# Patient Record
Sex: FEMALE | Race: BLACK OR AFRICAN AMERICAN | Employment: PART TIME | URBAN - METROPOLITAN AREA
[De-identification: names, ages, dates, MRNs, and addresses within clinical notes are randomized per-mention and may not be internally consistent; named-entity substitution may affect disease eponyms.]

---

## 2023-08-11 LAB
CREATININE, EXTERNAL: 0.88
HBA1C MFR BLD HPLC: 6.6 %
LDL CHOLESTEROL, EXTERNAL: 56

## 2023-08-30 ENCOUNTER — OFFICE VISIT (OUTPATIENT)
Age: 43
End: 2023-08-30
Payer: MEDICAID

## 2023-08-30 VITALS
DIASTOLIC BLOOD PRESSURE: 82 MMHG | HEIGHT: 66 IN | WEIGHT: 273.6 LBS | SYSTOLIC BLOOD PRESSURE: 124 MMHG | BODY MASS INDEX: 43.97 KG/M2 | HEART RATE: 72 BPM

## 2023-08-30 DIAGNOSIS — E11.65 TYPE 2 DIABETES MELLITUS WITH HYPERGLYCEMIA, WITHOUT LONG-TERM CURRENT USE OF INSULIN (HCC): Primary | ICD-10-CM

## 2023-08-30 DIAGNOSIS — E66.01 CLASS 3 OBESITY (HCC): ICD-10-CM

## 2023-08-30 PROCEDURE — 99205 OFFICE O/P NEW HI 60 MIN: CPT | Performed by: INTERNAL MEDICINE

## 2023-08-30 PROCEDURE — 95251 CONT GLUC MNTR ANALYSIS I&R: CPT | Performed by: INTERNAL MEDICINE

## 2023-08-30 RX ORDER — PANTOPRAZOLE SODIUM 40 MG/1
TABLET, DELAYED RELEASE ORAL DAILY
COMMUNITY

## 2023-08-30 RX ORDER — PREGABALIN 75 MG/1
75 CAPSULE ORAL 2 TIMES DAILY
COMMUNITY

## 2023-08-30 RX ORDER — RIZATRIPTAN BENZOATE 10 MG/1
TABLET, ORALLY DISINTEGRATING ORAL
COMMUNITY
Start: 2023-04-14

## 2023-08-30 RX ORDER — MELOXICAM 7.5 MG/1
7.5 TABLET ORAL AS NEEDED
COMMUNITY
Start: 2023-04-13

## 2023-08-30 RX ORDER — TRANEXAMIC ACID 650 MG/1
1300 TABLET ORAL AS NEEDED
COMMUNITY

## 2023-08-30 RX ORDER — COVID-19 ANTIGEN TEST
KIT MISCELLANEOUS AS NEEDED
COMMUNITY

## 2023-08-30 RX ORDER — OMEGA-3 FATTY ACIDS/FISH OIL 300-1000MG
CAPSULE ORAL AS NEEDED
COMMUNITY

## 2023-08-30 RX ORDER — METHOCARBAMOL 750 MG/1
750 TABLET, FILM COATED ORAL AS NEEDED
COMMUNITY
Start: 2022-11-16

## 2023-08-30 RX ORDER — METFORMIN HYDROCHLORIDE 500 MG/1
500 TABLET, EXTENDED RELEASE ORAL DAILY
COMMUNITY

## 2023-08-30 NOTE — PROGRESS NOTES
Chief Complaint   Patient presents with    New Patient     PCP and pharmacy confirmed     Diabetes     History of Present Illness: Enrrique Castro is a 41 y.o. female who is a new patient for evaluation of diabetes. Transferring care from Dr. Trupti Matute due to change in insurance and last visit was in March 2023. Was diagnosed with diabetes sometime in the past 5 years or more. Current regimen is metformin  mg 1 tab daily in the afternoon but if her sugars are over about 185, will often take a 2nd tab. Has not been on any other meds for DM aside from 1 [facility] for a few months years ago but doesn't recall any side effects to this and had a sample of trulicity but broke out in hives after taking this. Using [employer] to check her sugars and tends to scan 4 or more times a day. Review of her data over the past 90 days shows an avg of 131 and 93% time in range. Fasting sugar was 130 this morning. Most recent Hgb A1c was 6.6% in 8/23 down from 6.7% in 11/22, down from 7.8% in 6/22 up from 7.6% in 4/22. 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 No history of vascular disease. No history of retinopathy, (+) mild neuropathy, no nephropathy. Last eye exam was 3/23. Has not needed any meds for cholesterol.   Previously had taken lisinopril for blood pressure in the past.              Past Medical History:

## 2023-08-30 NOTE — PATIENT INSTRUCTIONS
1) Your Hemoglobin A1c (3 month test of blood sugar) is very good at 6.6% and goal is under 7% but as close to 6.5% or less as possible. 2) Do your best to focus on the meals that are causing you to spike over 180 when checked 2 hours after a meal and limit your portions of these foods. 3) The key to losing weight is less circulating insulin as the more insulin that circulates in your blood, the harder it is to burn belly fat. Every time you eat or drink anything with sugar, your pancreas produces more insulin and this will lead to more difficulty losing weight so the more time you spend in a fasting state, the better you will be able to lose weight and when you do eat, you want to limit your carbohydrate intake as best as possible. Try intermittent fasting where you eat a meal at noon and another one at 6pm and then don't eat any food for 18 hours (or pick another 6 hour window that works for you). If you get hungry instead of eating, try drinking 8-12 oz of water. Try not to eat more than 45 grams of carbs for your 2 meals (1 palm/fist sized serving = 30 grams; carbs are potatoes, rice, pasta, bread, fruit, corn, peas, cereal, desserts). If you really want to lose weight, try not to eat more than 30 grams at your 2 meals. 4) I will hold on any change to your diabetes regimen and stay on 1 tab of metformin daily for now but it's fine to take a 2nd tab if you have a reading over 180.    5) Your kidney and liver and cholesterol are all normal.  Your blood pressure looks good. 6) Please call [phone] to reset your [employer] password if you can't get in so that way you can communicate with me in between visits.

## 2023-09-14 ENCOUNTER — HOSPITAL ENCOUNTER (OUTPATIENT)
Facility: HOSPITAL | Age: 43
Discharge: HOME | End: 2023-09-14
Payer: MEDICAID

## 2023-09-14 DIAGNOSIS — M25.511 RIGHT SHOULDER PAIN, UNSPECIFIED CHRONICITY: ICD-10-CM

## 2023-09-14 PROCEDURE — 73221 MRI JOINT UPR EXTREM W/O DYE: CPT

## 2023-10-26 NOTE — TELEPHONE ENCOUNTER
10/26/2023  10:30 AM    Pt called and left a vm at 10:25 am stating she needs a refill on her freestyle trace 2 sensors. Pt can be reached [phone].     Thanks, Christine Valadez

## 2024-01-05 ENCOUNTER — HOSPITAL ENCOUNTER (OUTPATIENT)
Facility: HOSPITAL | Age: 44
End: 2024-01-05
Attending: ORTHOPAEDIC SURGERY
Payer: MEDICAID

## 2024-01-05 DIAGNOSIS — S49.91XA INJURY OF RIGHT SHOULDER, INITIAL ENCOUNTER: ICD-10-CM

## 2024-01-05 PROCEDURE — 73221 MRI JOINT UPR EXTREM W/O DYE: CPT

## 2024-02-14 DIAGNOSIS — E11.65 TYPE 2 DIABETES MELLITUS WITH HYPERGLYCEMIA, WITHOUT LONG-TERM CURRENT USE OF INSULIN (HCC): ICD-10-CM

## 2024-02-28 ENCOUNTER — OFFICE VISIT (OUTPATIENT)
Age: 44
End: 2024-02-28
Payer: MEDICAID

## 2024-02-28 VITALS
WEIGHT: 258.8 LBS | HEART RATE: 90 BPM | SYSTOLIC BLOOD PRESSURE: 139 MMHG | HEIGHT: 66 IN | BODY MASS INDEX: 41.59 KG/M2 | DIASTOLIC BLOOD PRESSURE: 73 MMHG

## 2024-02-28 DIAGNOSIS — E66.01 CLASS 3 OBESITY (HCC): ICD-10-CM

## 2024-02-28 DIAGNOSIS — E11.65 TYPE 2 DIABETES MELLITUS WITH HYPERGLYCEMIA, WITHOUT LONG-TERM CURRENT USE OF INSULIN (HCC): Primary | ICD-10-CM

## 2024-02-28 LAB — HBA1C MFR BLD: 12.6 %

## 2024-02-28 PROCEDURE — 83036 HEMOGLOBIN GLYCOSYLATED A1C: CPT | Performed by: INTERNAL MEDICINE

## 2024-02-28 PROCEDURE — 99214 OFFICE O/P EST MOD 30 MIN: CPT | Performed by: INTERNAL MEDICINE

## 2024-02-28 RX ORDER — FROVATRIPTAN SUCCINATE 2.5 MG/1
TABLET, FILM COATED ORAL PRN
COMMUNITY
Start: 2023-08-29

## 2024-02-28 RX ORDER — GLIPIZIDE 10 MG/1
10 TABLET ORAL 2 TIMES DAILY
Qty: 60 TABLET | Refills: 11 | Status: SHIPPED | OUTPATIENT
Start: 2024-02-28

## 2024-02-28 RX ORDER — METFORMIN HYDROCHLORIDE 500 MG/1
TABLET, EXTENDED RELEASE ORAL
Qty: 60 TABLET | Refills: 11 | Status: SHIPPED | OUTPATIENT
Start: 2024-02-28

## 2024-02-28 RX ORDER — CYCLOBENZAPRINE HCL 10 MG
10 TABLET ORAL NIGHTLY PRN
COMMUNITY

## 2024-02-28 RX ORDER — FERROUS SULFATE 324(65)MG
324 TABLET, DELAYED RELEASE (ENTERIC COATED) ORAL
COMMUNITY

## 2024-02-28 RX ORDER — HYDROXYZINE HYDROCHLORIDE 25 MG/1
TABLET, FILM COATED ORAL
COMMUNITY

## 2024-02-28 NOTE — PATIENT INSTRUCTIONS
1) Your Hemoglobin A1c (3 month test of blood sugar) was 12.6% and it was 6.6% at your last check.    2) Start glipizide 10 mg 1 tab 5-10 min before breakfast and before dinner and stay on 2 tabs of metformin per day together or split 1 tab twice daily.  I sent prescriptions for both of these to your pharmacy.    3) Avoid ice cream and tropical smoothie as best as possible and stick to water and non-sugar filled drinks.    4) Keep track of your sugar on your trace and it should be coming down over the next few weeks and my goal is to have this consistently under 200 for 2 weeks to safely have the surgery.  Plan on checking a fructosamine in 1 month as this will give your average sugar over the next 4 weeks and if this is acceptable then I will let Dr. Knapp know that you can have surgery at that time.      5) Please call [phone] to reset your [employer] password.
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
## 2024-03-16 LAB
BUN SERPL-MCNC: 14 MG/DL (ref 6–24)
BUN/CREAT SERPL: 19 (ref 9–23)
CALCIUM SERPL-MCNC: 9.8 MG/DL (ref 8.7–10.2)
CHLORIDE SERPL-SCNC: 103 MMOL/L (ref 96–106)
CO2 SERPL-SCNC: 23 MMOL/L (ref 20–29)
CREAT SERPL-MCNC: 0.74 MG/DL (ref 0.57–1)
EGFRCR SERPLBLD CKD-EPI 2021: 104 ML/MIN/1.73
FRUCTOSAMINE SERPL-SCNC: 386 UMOL/L (ref 0–285)
GLUCOSE SERPL-MCNC: 119 MG/DL (ref 70–99)
POTASSIUM SERPL-SCNC: 4 MMOL/L (ref 3.5–5.2)
SODIUM SERPL-SCNC: 140 MMOL/L (ref 134–144)

## 2024-03-20 ENCOUNTER — TELEPHONE (OUTPATIENT)
Age: 44
End: 2024-03-20
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
## 2024-03-20 NOTE — TELEPHONE ENCOUNTER
Please call her with her lab results:    I saw you went and did your labs on 3/15/24 when my plan was to wait for a full month which would be 3/28/24. Your sugar on the lab draw was very good at 119 but your fructosamine was elevated at 386 and this is equivalent to an average sugar of 224 over the past 3 weeks. What has your sugar been running at home on your trace over the past week? Let me know when you have a chance.

## 2024-03-21 RX ORDER — METFORMIN HYDROCHLORIDE 500 MG/1
TABLET, EXTENDED RELEASE ORAL
Qty: 90 TABLET | Refills: 11 | Status: SHIPPED | OUTPATIENT
Start: 2024-03-21

## 2024-03-21 NOTE — TELEPHONE ENCOUNTER
Called and spoke with pt.  She has been compliant with metformin and glipizide and her sugar this morning was 153 at 4am and then 181 at 11am and 2pm but up to 213 at 5p and she took 1/2 tab of glipizide and it was down to 184 while I was on the phone with her.  I told her that I would like her to try and increase her metformin to 1 tab in am and 2 tabs at night and do this for the next 2 weeks and I sent an updated prescription for this dose to [facility].  I want her to go back to [facility] to repeat her fructosamine and bmp in 2 more weeks around 4/4/24 and then I will be in touch with the results to see if we can clear her for surgery at that time.  she voiced understanding of this plan.

## 2024-03-28 DIAGNOSIS — E11.65 TYPE 2 DIABETES MELLITUS WITH HYPERGLYCEMIA, WITHOUT LONG-TERM CURRENT USE OF INSULIN (HCC): ICD-10-CM

## 2024-04-05 ENCOUNTER — TELEPHONE (OUTPATIENT)
Age: 44
End: 2024-04-05

## 2024-04-05 LAB
BUN SERPL-MCNC: 16 MG/DL (ref 6–24)
BUN/CREAT SERPL: 19 (ref 9–23)
CALCIUM SERPL-MCNC: 10.4 MG/DL (ref 8.7–10.2)
CHLORIDE SERPL-SCNC: 100 MMOL/L (ref 96–106)
CO2 SERPL-SCNC: 20 MMOL/L (ref 20–29)
CREAT SERPL-MCNC: 0.84 MG/DL (ref 0.57–1)
EGFRCR SERPLBLD CKD-EPI 2021: 89 ML/MIN/1.73
FRUCTOSAMINE SERPL-SCNC: 321 UMOL/L (ref 0–285)
GLUCOSE SERPL-MCNC: 90 MG/DL (ref 70–99)
POTASSIUM SERPL-SCNC: 4.4 MMOL/L (ref 3.5–5.2)
SODIUM SERPL-SCNC: 140 MMOL/L (ref 134–144)

## 2024-04-05 RX ORDER — GLIPIZIDE 10 MG/1
TABLET ORAL
Qty: 45 TABLET | Refills: 11 | Status: SHIPPED | OUTPATIENT
Start: 2024-04-05

## 2024-04-05 NOTE — TELEPHONE ENCOUNTER
Please fax my office note to Dr. Knapp's office and call them to confirm receipt (in addition to the fax confirmation) so that way they have my note to show that she is cleared for rotator cuff surgery from an endocrine perspective at this time.

## 2024-04-08 NOTE — TELEPHONE ENCOUNTER
LVM asking Dr Ramirez medical assistant if the progress note from Dr Bryant has been received. Asked that someone call office back to confirm receipt. Callback number left. Note faxed, confirmation was received.

## 2024-06-05 ENCOUNTER — OFFICE VISIT (OUTPATIENT)
Age: 44
End: 2024-06-05
Payer: MEDICAID

## 2024-06-05 VITALS
WEIGHT: 263 LBS | SYSTOLIC BLOOD PRESSURE: 132 MMHG | DIASTOLIC BLOOD PRESSURE: 89 MMHG | BODY MASS INDEX: 42.27 KG/M2 | HEART RATE: 86 BPM | HEIGHT: 66 IN

## 2024-06-05 DIAGNOSIS — E11.65 TYPE 2 DIABETES MELLITUS WITH HYPERGLYCEMIA, WITHOUT LONG-TERM CURRENT USE OF INSULIN (HCC): Primary | ICD-10-CM

## 2024-06-05 DIAGNOSIS — E66.01 CLASS 3 OBESITY (HCC): ICD-10-CM

## 2024-06-05 LAB — HBA1C MFR BLD: 6.9 %

## 2024-06-05 PROCEDURE — 83036 HEMOGLOBIN GLYCOSYLATED A1C: CPT | Performed by: INTERNAL MEDICINE

## 2024-06-05 PROCEDURE — 99214 OFFICE O/P EST MOD 30 MIN: CPT | Performed by: INTERNAL MEDICINE

## 2024-06-05 RX ORDER — LIRAGLUTIDE 6 MG/ML
INJECTION SUBCUTANEOUS
Qty: 9 ML | Refills: 11 | Status: SHIPPED | OUTPATIENT
Start: 2024-06-05

## 2024-06-05 RX ORDER — PEN NEEDLE, DIABETIC 32GX 5/32"
1 NEEDLE, DISPOSABLE MISCELLANEOUS DAILY
Qty: 100 EACH | Refills: 3 | Status: SHIPPED | OUTPATIENT
Start: 2024-06-05

## 2024-06-05 RX ORDER — METFORMIN HYDROCHLORIDE 500 MG/1
TABLET, EXTENDED RELEASE ORAL
Qty: 90 TABLET | Refills: 11
Start: 2024-06-05

## 2024-06-05 NOTE — PROGRESS NOTES
14 days Dx Code: E11.65    tranexamic acid (LYSTEDA) 650 MG TABS tablet Take 2 tablets by mouth as needed    rizatriptan (MAXALT-MLT) 10 MG disintegrating tablet Take by mouth once as needed    pantoprazole (PROTONIX) 40 MG tablet Take by mouth as needed    pregabalin (LYRICA) 75 MG capsule Take 1 capsule by mouth 2 times daily.    Naproxen Sodium (ALEVE) 220 MG CAPS Take by mouth as needed for Pain    ibuprofen (ADVIL;MOTRIN) 200 MG CAPS capsule Take by mouth as needed    Multiple Vitamin (MULTIVITAMIN ADULT PO) Take by mouth daily    ALPHA LIPOIC ACID PO Take by mouth daily    CALCIUM PO Take by mouth daily    ascorbic acid (VITAMIN C) 100 MG tablet Take by mouth daily     No current facility-administered medications for this visit.     Allergies   Allergen Reactions    Augmentin [Amoxicillin-Pot Clavulanate] Other (See Comments)     Thrush      Benadryl [Diphenhydramine] Hives    Trulicity [Dulaglutide] Hives       Review of Systems: PER HPI    Physical Examination:  Blood pressure 132/89, pulse 86, height 1.676 m (5' 6\"), weight 119.3 kg (263 lb).  General: pleasant, no distress, good eye contact   Neck: no carotid bruits  Cardiovascular: regular, normal rate, nl s1 and s2, no m/r/g,   Respiratory: clear bilaterally  Integumentary: no edema,   Psychiatric: normal mood and affect    Data Reviewed:   Component      Latest Ref Rng 6/5/2024   Hemoglobin A1C, POC      % 6.9          Assessment/Plan:     1. Type 2 diabetes mellitus with hyperglycemia, without long-term current use of insulin ([facility]): Most recent Hgb A1c was 6.9% in 6/24 down from 12.6% in 2/24 up from 6.6% in 8/23 (1st visit with me) down from 6.7% in 11/22, down from 7.8% in 6/22 up from 7.6% in 4/22.  A1c is back at goal <7%.  My preference is to add jardiance as next med rather than victoza, ozempic or mounjaro given h/o GERD and prior h/o gastric sleeve surgery as this class can make GI side effects very hard to tolerate but she prefers to try

## 2024-06-05 NOTE — PATIENT INSTRUCTIONS
1) Your Hemoglobin A1c (3 month test of blood sugar) is back to 6.9% which is at goal under 7%.    2) Stay on metformin 1 tab twice daily.    3) Stop the glipizide.    4) You will restart victoza at 0.6 mg once daily either in the morning or evening it doesn't matter if it's before or after a meal and do this for 7 days and then increase to 1.2 mg daily for 7 days and then increase to 1.8 mg daily.  The biggest side effect to watch for is nausea.  Watch out for any severe abdominal pain that goes to the back and is associated with nausea or vomiting as this may be due to pancreatitis which is the most severe but rarest side effect of this medication.  Please notify me if this occurs.

## 2024-06-10 ENCOUNTER — TELEPHONE (OUTPATIENT)
Age: 44
End: 2024-06-10

## 2024-06-10 RX ORDER — TIRZEPATIDE 2.5 MG/.5ML
INJECTION, SOLUTION SUBCUTANEOUS
Qty: 2 ML | Refills: 11 | Status: SHIPPED | OUTPATIENT
Start: 2024-06-10

## 2024-06-10 NOTE — TELEPHONE ENCOUNTER
Pt LVM stating Victoza is out of stock in multiple pharmacies that she has checked and has been told that they do not know when it will be back in stock. She asked what does Dr Bryant recommend .

## 2024-06-10 NOTE — TELEPHONE ENCOUNTER
Let her know I sent mounjaro to her pharmacy to start at the lowest dose of 2.5 mg once a week to see if this will go through and if not, we'll work on a prior authorization.    I'm not sure if she checks mychart or not but this is the instruction that I write for this drug and if she wants this sent to [employer], you can copy this in a message to her:    We will try Mounjaro in place of victoza.   Each pen is its own dose and only requires 3 easy steps that are all outlined in a packet that comes with the box of pens: 1) take the cap off the bottom of the pen, 2) switch to the unlock position (you'll see a lock symbol and unlock symbol on the top of the pen), and 3) push down on the button and hold in place for 10 seconds after you hear the click which is when the needle has injected the med under the skin.      The unused pens should be kept in the fridge and I recommend pulling out the pen for the weekly dose about 30 minutes prior to the injection to allow it to come to room temperature. Take this once a week either in the morning or in the evening and it doesn't matter if this is before or after a meal.  The most common side effect is nausea.  Watch out for any severe abdominal pain that goes to the back and is associated with nausea or vomiting as this may be due to pancreatitis which is the most severe but rarest side effect of this medication.  Please notify me if this occurs.  Take the pens one week apart and provided you are tolerating the dose after 3 weeks, then let me know and I'll send the next dose of 5 mg to the pharmacy.  If still tolerating after 3 more weeks, then let me know and we can increase to the next dose of 7.5 mg weekly and every month if you want to keep increasing the dose, then be in touch after your 3rd dose and we'll go up to the next dose that is 2.5 mg higher.

## 2024-06-11 NOTE — TELEPHONE ENCOUNTER
Patient notified of message per Dr. Bryant and voiced understanding of what was read to them. Pt given [employer] number to gain access to her account.      PA initiated through [email or web address] for Mounjaro 2.5 mg

## 2024-07-05 RX ORDER — TIRZEPATIDE 5 MG/.5ML
INJECTION, SOLUTION SUBCUTANEOUS
Qty: 2 ML | Refills: 11 | Status: SHIPPED | OUTPATIENT
Start: 2024-07-05

## 2024-07-06 ENCOUNTER — TELEPHONE (OUTPATIENT)
Age: 44
End: 2024-07-06

## 2024-07-06 NOTE — TELEPHONE ENCOUNTER
Prerna can you look into a PA for her lindsay sensors based on the [employer] exchange below:  -------------------------------------------------------------------------------------------------------------------    Ok.  I'll have Prerna look into this next week.  Thanks!  ===View-only below this line===      ----- Message -----       From:Emy Grant       Sent:7/6/2024 12:35 PM EDT         To:Dr. Farzad Bryant    Subject:Freestyle Lindsay 2 Sensors Prior Authorization     Hello Dr. Farazd Bryant,  I was reaching out because I contacted [facility] pharmacy regarding refilling my Freestyle Lindsay 2 sensors and I was told by the pharmacist that it required new prior authorization from my physician for insurance approval.     Emy Grant

## 2024-07-11 NOTE — TELEPHONE ENCOUNTER
Sent her the following message through [employer]:    I received a fax from your insurance that your trace sensors are not covered because at the time you started using this device you did not need insulin more than once per day or were on an insulin pump.  When I met you in August 2023, I had told you that I was surprised you were able to qualify for this and I'm not surprised by this denial since you were never on insulin and are currently not on insulin.  Therefore if you would like to remain on these sensors, unfortunately you will have to pay cash and use the [employer] coupon to get 2 sensors for around $75.  I'm sorry about this but there is no way around your insurance's requirements for approval for the sensors.  Thanks for your understanding.

## 2024-07-29 DIAGNOSIS — E11.65 TYPE 2 DIABETES MELLITUS WITH HYPERGLYCEMIA, WITHOUT LONG-TERM CURRENT USE OF INSULIN (HCC): Primary | ICD-10-CM

## 2024-07-29 RX ORDER — LANCETS 30 GAUGE
EACH MISCELLANEOUS
Qty: 100 EACH | Refills: 3 | Status: SHIPPED | OUTPATIENT
Start: 2024-07-29

## 2024-07-29 RX ORDER — GLUCOSAMINE HCL/CHONDROITIN SU 500-400 MG
CAPSULE ORAL
Qty: 100 STRIP | Refills: 3 | Status: SHIPPED | OUTPATIENT
Start: 2024-07-29

## 2024-08-05 RX ORDER — TIRZEPATIDE 7.5 MG/.5ML
INJECTION, SOLUTION SUBCUTANEOUS
Qty: 2 ML | Refills: 11 | Status: SHIPPED | OUTPATIENT
Start: 2024-08-05

## 2024-09-05 DIAGNOSIS — E11.65 TYPE 2 DIABETES MELLITUS WITH HYPERGLYCEMIA, WITHOUT LONG-TERM CURRENT USE OF INSULIN (HCC): Primary | ICD-10-CM

## 2024-09-05 RX ORDER — TIRZEPATIDE 10 MG/.5ML
INJECTION, SOLUTION SUBCUTANEOUS
Qty: 2 ML | Refills: 11 | Status: SHIPPED | OUTPATIENT
Start: 2024-09-05

## 2024-10-16 DIAGNOSIS — E11.65 TYPE 2 DIABETES MELLITUS WITH HYPERGLYCEMIA, WITHOUT LONG-TERM CURRENT USE OF INSULIN (HCC): ICD-10-CM

## 2024-10-16 DIAGNOSIS — E66.813 CLASS 3 OBESITY: ICD-10-CM

## 2024-10-24 LAB
ALBUMIN SERPL-MCNC: 4.2 G/DL (ref 3.9–4.9)
ALBUMIN/CREAT UR: 21 MG/G CREAT (ref 0–29)
ALP SERPL-CCNC: 71 IU/L (ref 44–121)
ALT SERPL-CCNC: 21 IU/L (ref 0–32)
AST SERPL-CCNC: 19 IU/L (ref 0–40)
BILIRUB SERPL-MCNC: 0.5 MG/DL (ref 0–1.2)
BUN SERPL-MCNC: 15 MG/DL (ref 6–24)
BUN/CREAT SERPL: 16 (ref 9–23)
CALCIUM SERPL-MCNC: 9.6 MG/DL (ref 8.7–10.2)
CHLORIDE SERPL-SCNC: 104 MMOL/L (ref 96–106)
CHOLEST SERPL-MCNC: 159 MG/DL (ref 100–199)
CO2 SERPL-SCNC: 22 MMOL/L (ref 20–29)
CREAT SERPL-MCNC: 0.91 MG/DL (ref 0.57–1)
CREAT UR-MCNC: 240.5 MG/DL
EGFRCR SERPLBLD CKD-EPI 2021: 81 ML/MIN/1.73
GLOBULIN SER CALC-MCNC: 3 G/DL (ref 1.5–4.5)
GLUCOSE SERPL-MCNC: 109 MG/DL (ref 70–99)
HBA1C MFR BLD: 6.2 % (ref 4.8–5.6)
HDLC SERPL-MCNC: 46 MG/DL
LDLC SERPL CALC-MCNC: 92 MG/DL (ref 0–99)
MICROALBUMIN UR-MCNC: 49.4 UG/ML
POTASSIUM SERPL-SCNC: 4.4 MMOL/L (ref 3.5–5.2)
PROT SERPL-MCNC: 7.2 G/DL (ref 6–8.5)
SODIUM SERPL-SCNC: 140 MMOL/L (ref 134–144)
TRIGL SERPL-MCNC: 117 MG/DL (ref 0–149)
VLDLC SERPL CALC-MCNC: 21 MG/DL (ref 5–40)

## 2024-10-30 ENCOUNTER — OFFICE VISIT (OUTPATIENT)
Age: 44
End: 2024-10-30
Payer: MEDICAID

## 2024-10-30 VITALS
HEIGHT: 66 IN | DIASTOLIC BLOOD PRESSURE: 85 MMHG | BODY MASS INDEX: 41.43 KG/M2 | HEART RATE: 102 BPM | SYSTOLIC BLOOD PRESSURE: 130 MMHG | WEIGHT: 257.8 LBS

## 2024-10-30 DIAGNOSIS — E11.65 TYPE 2 DIABETES MELLITUS WITH HYPERGLYCEMIA, WITHOUT LONG-TERM CURRENT USE OF INSULIN (HCC): Primary | ICD-10-CM

## 2024-10-30 DIAGNOSIS — E66.813 CLASS 3 OBESITY: ICD-10-CM

## 2024-10-30 PROCEDURE — 3044F HG A1C LEVEL LT 7.0%: CPT | Performed by: INTERNAL MEDICINE

## 2024-10-30 PROCEDURE — 99214 OFFICE O/P EST MOD 30 MIN: CPT | Performed by: INTERNAL MEDICINE

## 2024-10-30 RX ORDER — TIRZEPATIDE 12.5 MG/.5ML
12.5 INJECTION, SOLUTION SUBCUTANEOUS
Qty: 2 ML | Refills: 11 | Status: SHIPPED | OUTPATIENT
Start: 2024-10-30

## 2024-10-30 RX ORDER — MEGESTROL ACETATE 20 MG/1
20 TABLET ORAL DAILY
COMMUNITY
Start: 2024-10-15

## 2024-10-30 RX ORDER — HYDROCODONE BITARTRATE AND ACETAMINOPHEN 5; 325 MG/1; MG/1
TABLET ORAL PRN
COMMUNITY
Start: 2024-10-15

## 2024-10-30 NOTE — PATIENT INSTRUCTIONS
1) Your Hemoglobin A1c (3 month test of blood sugar) is excellent at 6.2% down from 6.9%.  Use up the 10 mg mounjaro this month and I sent the 12.5 mg dose to the pharmacy to start 7 days after your last dose of 10 mg.  If you are tolerating the 12.5 mg and want to move up to the 15 mg dose, which is the max dose, then let me know in the future.    2) BUN and creatinine are markers of kidney function.  Your values are normal.    3) ALT and AST are markers of liver function.  Your values are normal.    4) Your blood pressure and cholesterol and urine are all normal.    5) We will just draw a Hemoglobin A1c in the office next time and you won't need any additional labs.

## 2024-10-30 NOTE — PROGRESS NOTES
BUN,BUNPL      6 - 24 mg/dL 15    Creatinine      0.57 - 1.00 mg/dL 0.91    Est, Glom Filt Rate      >59 mL/min/1.73 81    Bun/Cre      9 - 23  16    Sodium      134 - 144 mmol/L 140    Potassium      3.5 - 5.2 mmol/L 4.4    Chloride      96 - 106 mmol/L 104    CARBON DIOXIDE      20 - 29 mmol/L 22    Calcium      8.7 - 10.2 mg/dL 9.6    Total Protein      6.0 - 8.5 g/dL 7.2    Albumin      3.9 - 4.9 g/dL 4.2    Globulin, Total      1.5 - 4.5 g/dL 3.0    Total Bilirubin      0.0 - 1.2 mg/dL 0.5    Alkaline Phosphatase      44 - 121 IU/L 71    AST      0 - 40 IU/L 19    ALT      0 - 32 IU/L 21    Cholesterol, Total      100 - 199 mg/dL 159    Triglycerides      0 - 149 mg/dL 117    HDL Cholesterol      >39 mg/dL 46    VLDL      5 - 40 mg/dL 21    LDL Cholesterol      0 - 99 mg/dL 92    Creatinine, Ur      Not Estab. mg/dL 240.5    Albumin, U      Not Estab. ug/mL 49.4    Microalb/Creat Ratio POC      0 - 29 mg/g creat 21    Hemoglobin A1C      4.8 - 5.6 % 6.2 (H)           Assessment/Plan:     1. Type 2 diabetes mellitus with hyperglycemia, without long-term current use of insulin ([facility]): Most recent Hgb A1c was 6.2% in 10/24 down from 6.9% in 6/24 down from 12.6% in 2/24 up from 6.6% in 8/23 (1st visit with me) down from 6.7% in 11/22, down from 7.8% in 6/22 up from 7.6% in 4/22.  A1c remains at goal <7%.  She has been medically stable on mounjaro and A1c is at goal and previously had hives with trulicity and victoza was unavailable so for medical necessity needs to stay on mounjaro to keep her A1c at goal.  - cont mounjaro 10 mg weekly x 2 more doses and then increase to 12.5 mg weekly  - cont metformin  mg 1 tab bid  - check bs 4 times daily using trace  - foot exam done 10/24  - optho UTD 3/23  - her BP was at goal < 140/90 not on any meds  - LDL 56 in 8/23 and 92 in 10/24  - TSH 1.14 in 8/23  - microalbumin nl 10/24  - check POC A1c at next visit         2. Class 3 obesity (HCC): Wt 273 in 8/23 down to

## 2025-04-30 ENCOUNTER — OFFICE VISIT (OUTPATIENT)
Age: 45
End: 2025-04-30
Payer: MEDICAID

## 2025-04-30 ENCOUNTER — RESULTS FOLLOW-UP (OUTPATIENT)
Age: 45
End: 2025-04-30

## 2025-04-30 VITALS
HEART RATE: 91 BPM | SYSTOLIC BLOOD PRESSURE: 130 MMHG | HEIGHT: 66 IN | WEIGHT: 247.4 LBS | DIASTOLIC BLOOD PRESSURE: 84 MMHG | BODY MASS INDEX: 39.76 KG/M2

## 2025-04-30 DIAGNOSIS — E66.812 CLASS 2 OBESITY: ICD-10-CM

## 2025-04-30 DIAGNOSIS — E11.65 TYPE 2 DIABETES MELLITUS WITH HYPERGLYCEMIA, WITHOUT LONG-TERM CURRENT USE OF INSULIN (HCC): Primary | ICD-10-CM

## 2025-04-30 LAB — HBA1C MFR BLD: 5.1 %

## 2025-04-30 PROCEDURE — 83036 HEMOGLOBIN GLYCOSYLATED A1C: CPT | Performed by: INTERNAL MEDICINE

## 2025-04-30 PROCEDURE — 99214 OFFICE O/P EST MOD 30 MIN: CPT | Performed by: INTERNAL MEDICINE

## 2025-04-30 PROCEDURE — 3044F HG A1C LEVEL LT 7.0%: CPT | Performed by: INTERNAL MEDICINE

## 2025-04-30 RX ORDER — AMMONIUM LACTATE 12 G/100G
CREAM TOPICAL
COMMUNITY
Start: 2025-02-11

## 2025-04-30 RX ORDER — METFORMIN HYDROCHLORIDE 500 MG/1
TABLET, EXTENDED RELEASE ORAL
Qty: 60 TABLET | Refills: 11 | Status: SHIPPED | OUTPATIENT
Start: 2025-04-30

## 2025-04-30 RX ORDER — HYDROXYZINE HYDROCHLORIDE 25 MG/1
25 TABLET, FILM COATED ORAL 3 TIMES DAILY PRN
COMMUNITY

## 2025-04-30 RX ORDER — MELOXICAM 15 MG/1
15 TABLET ORAL
COMMUNITY

## 2025-04-30 RX ORDER — METHOCARBAMOL 750 MG/1
750 TABLET, FILM COATED ORAL 4 TIMES DAILY
COMMUNITY

## 2025-04-30 NOTE — PATIENT INSTRUCTIONS
1) Your Hemoglobin A1c (3 month test of blood sugar) is normal at 5.1% and your weight is down another 10 lbs.  Keep up the good work!    2) Going forward I will have you just follow up with your PCP and if anything worsens in the future, I'm happy to see you back.  Feel free to send me a message through [employer] or call me at [phone] if you have any questions or concerns in the future.    3) I will ensure you have refills of your metformin and mounjaro until 2026 and in the future, will have your PCP take these over.

## 2025-04-30 NOTE — PROGRESS NOTES
Chief Complaint   Patient presents with    Diabetes     PCP and pharmacy confirmed  Pt consented to use of ALESSANDRO     History of Present Illness: Emy Grant is a 45 y.o. female here for follow up of diabetes.  Weight down 10 lbs since last visit in 10/24.      History of Present Illness  The patient is a 45-year-old female here for follow-up of diabetes.    The chief complaint is diabetes management. She has been on the maximum dose of Mounjaro 15 mg for approximately 2 months, which is reported as being well-tolerated with no exacerbation of gastrointestinal symptoms such as upset stomach, nausea, vomiting, or constipation. Significant weight loss and improved glycemic control have been achieved, with home blood glucose readings consistently between 98 and 100, occasionally slightly elevated. Her current medication regimen includes metformin, taken once daily, with an additional dose taken only when her blood glucose levels are slightly elevated. Mounjaro is administered once weekly. Concerns about potential insurance coverage issues related to her continued use of Mounjaro and the possibility of needing to switch to Ozempic have been expressed. She has a history of weight loss surgery.    Weight history includes a weight of 273 lbs in August 2023, 257 lbs in October 2024, and 247 lbs currently. An HbA1c of 5.1% is noted, which is a significant improvement from 6.2% previously and 12.6% a year ago.    She had a 6-month physical with her primary care physician, Dr. Khan, who did a panel, and everything seems to be normal. She also sees Dr. Guzman, who did labs on her as well recently. She sees an ophthalmologist and had an eye exam this year in February or March. No new bleeding in the eyes is reported. She wears contacts. Mild ankle swelling is occasionally experienced.    PAST SURGICAL HISTORY:  A fibroid was removed in October 2024. She recently had a sonohysterogram, which showed that everything looks much

## 2025-07-07 RX ORDER — METFORMIN HYDROCHLORIDE 500 MG/1
TABLET, EXTENDED RELEASE ORAL
Qty: 90 TABLET | Refills: 11 | OUTPATIENT
Start: 2025-07-07